# Patient Record
Sex: MALE | URBAN - METROPOLITAN AREA
[De-identification: names, ages, dates, MRNs, and addresses within clinical notes are randomized per-mention and may not be internally consistent; named-entity substitution may affect disease eponyms.]

---

## 2022-11-08 ENCOUNTER — APPOINTMENT (OUTPATIENT)
Dept: URBAN - METROPOLITAN AREA CLINIC 276 | Age: 27
Setting detail: DERMATOLOGY
End: 2022-11-08

## 2022-11-08 DIAGNOSIS — L21.8 OTHER SEBORRHEIC DERMATITIS: ICD-10-CM

## 2022-11-08 PROCEDURE — 99204 OFFICE O/P NEW MOD 45 MIN: CPT

## 2022-11-08 PROCEDURE — OTHER PRESCRIPTION: OTHER

## 2022-11-08 PROCEDURE — OTHER MIPS QUALITY: OTHER

## 2022-11-08 PROCEDURE — OTHER COUNSELING: OTHER

## 2022-11-08 RX ORDER — DESONIDE 0.5 MG/G
CREAM TOPICAL BID
Qty: 60 | Refills: 5 | Status: ERX | COMMUNITY
Start: 2022-11-08

## 2022-11-08 RX ORDER — KETOCONAZOLE 20 MG/ML
SHAMPOO, SUSPENSION TOPICAL
Qty: 120 | Refills: 5 | Status: ERX | COMMUNITY
Start: 2022-11-08

## 2022-11-08 ASSESSMENT — LOCATION DETAILED DESCRIPTION DERM
LOCATION DETAILED: RIGHT SUPERIOR ANTERIOR NECK
LOCATION DETAILED: MID-FRONTAL SCALP
LOCATION DETAILED: INFERIOR MID FOREHEAD

## 2022-11-08 ASSESSMENT — LOCATION SIMPLE DESCRIPTION DERM
LOCATION SIMPLE: ANTERIOR SCALP
LOCATION SIMPLE: INFERIOR FOREHEAD
LOCATION SIMPLE: RIGHT ANTERIOR NECK

## 2022-11-08 ASSESSMENT — LOCATION ZONE DERM
LOCATION ZONE: FACE
LOCATION ZONE: SCALP
LOCATION ZONE: NECK

## 2022-11-08 NOTE — HPI: DRY SKIN
How Severe Is Your Dry Skin?: moderate
Is This A New Presentation Or A Follow-Up?: Dry Skin
Additional History: Patients face used to be oily.  He used to use CeraVe then he started to develop big red circles on his forehead.  He then discontinued using ceraVe.  He has always had dandruff.
How Many Showers Or Baths Do You Take In One Day?: 1x week

## 2023-11-28 ENCOUNTER — RX ONLY (RX ONLY)
Age: 28
End: 2023-11-28

## 2023-11-28 ENCOUNTER — APPOINTMENT (OUTPATIENT)
Dept: URBAN - METROPOLITAN AREA CLINIC 276 | Age: 28
Setting detail: DERMATOLOGY
End: 2023-11-28

## 2023-11-28 DIAGNOSIS — L82.1 OTHER SEBORRHEIC KERATOSIS: ICD-10-CM

## 2023-11-28 DIAGNOSIS — L21.8 OTHER SEBORRHEIC DERMATITIS: ICD-10-CM

## 2023-11-28 DIAGNOSIS — L81.4 OTHER MELANIN HYPERPIGMENTATION: ICD-10-CM

## 2023-11-28 DIAGNOSIS — B07.8 OTHER VIRAL WARTS: ICD-10-CM

## 2023-11-28 DIAGNOSIS — D22 MELANOCYTIC NEVI: ICD-10-CM

## 2023-11-28 PROBLEM — D22.5 MELANOCYTIC NEVI OF TRUNK: Status: ACTIVE | Noted: 2023-11-28

## 2023-11-28 PROCEDURE — OTHER TREATMENT REGIMEN: OTHER

## 2023-11-28 PROCEDURE — 17110 DESTRUCT B9 LESION 1-14: CPT

## 2023-11-28 PROCEDURE — OTHER CANTHARIDIN: OTHER

## 2023-11-28 PROCEDURE — OTHER COUNSELING: OTHER

## 2023-11-28 PROCEDURE — OTHER SUNSCREEN RECOMMENDATIONS: OTHER

## 2023-11-28 PROCEDURE — 99213 OFFICE O/P EST LOW 20 MIN: CPT | Mod: 25

## 2023-11-28 RX ORDER — KETOCONAZOLE 20 MG/ML
SHAMPOO, SUSPENSION TOPICAL
Qty: 120 | Refills: 5 | Status: ACTIVE

## 2023-11-28 RX ORDER — DESONIDE 0.5 MG/G
CREAM TOPICAL BID
Qty: 60 | Refills: 5 | Status: ACTIVE

## 2023-11-28 ASSESSMENT — LOCATION ZONE DERM
LOCATION ZONE: NECK
LOCATION ZONE: SCALP
LOCATION ZONE: TRUNK
LOCATION ZONE: FACE
LOCATION ZONE: FEET

## 2023-11-28 ASSESSMENT — LOCATION SIMPLE DESCRIPTION DERM
LOCATION SIMPLE: LEFT UPPER BACK
LOCATION SIMPLE: POSTERIOR NECK
LOCATION SIMPLE: LEFT CHEEK
LOCATION SIMPLE: POSTERIOR SCALP
LOCATION SIMPLE: UPPER BACK
LOCATION SIMPLE: RIGHT PLANTAR SURFACE

## 2023-11-28 ASSESSMENT — LOCATION DETAILED DESCRIPTION DERM
LOCATION DETAILED: RIGHT LATERAL TRAPEZIAL NECK
LOCATION DETAILED: POSTERIOR MID-PARIETAL SCALP
LOCATION DETAILED: LEFT SUPERIOR UPPER BACK
LOCATION DETAILED: LEFT MEDIAL MALAR CHEEK
LOCATION DETAILED: SUPERIOR THORACIC SPINE
LOCATION DETAILED: RIGHT MEDIAL PLANTAR HEEL

## 2023-11-28 NOTE — PROCEDURE: CANTHARIDIN
Cantharone Plus Duration Text (Please Remove Duration From Postcare): The patient was instructed to leave the Cantharone Plus on for 6-8 hours and then wash the area well with soap and water.
Include Z78.9 (Other Specified Conditions Influencing Health Status) As An Associated Diagnosis?: Yes
Cantharone Duration Text (Please Remove Duration From Postcare): The patient was instructed to leave the Cantharone on for 6-8 hours and then wash the area well with soap and water.
Post-Care Instructions: I reviewed with the patient in detail post-care instructions. The patient understands that the treated areas should be washed off 6 to 8 hours after application.
Add 52 Modifier (Optional): no
Consent: The patient's consent was obtained including but not limited to risks of crusting, scabbing, scarring, blistering, darker or lighter pigmentary change, recurrence, incomplete removal and infection.
Canthacur Duration Text (Please Remove Duration From Postcare): The patient was instructed to leave the Canthacur on for 6-8 hours and then wash the area well with soap and water.
Strength: [facility] plus
Medical Necessity Information: It is in your best interest to select a reason for this procedure from the list below. All of these items fulfill various CMS LCD requirements except the new and changing color options.
Detail Level: Zone
Cantharone Forte Duration Text (Please Remove Duration From Postcare): The patient was instructed to leave the Cantharone Forte on for 6-8 hours and then wash the area well with soap and water.
Curette Text: Prior to application of cantharidin the lesions were lightly pared with a curette.
Medical Necessity Clause: This procedure was medically necessary because the lesions that were treated were:
Canthacur Ps Duration Text (Please Remove Duration From Postcare): The patient was instructed to leave the Canthacur PS on for 6-8 hours and then wash the area well with soap and water.

## 2024-01-08 ENCOUNTER — APPOINTMENT (OUTPATIENT)
Dept: URBAN - METROPOLITAN AREA CLINIC 276 | Age: 29
Setting detail: DERMATOLOGY
End: 2024-01-09

## 2024-01-08 DIAGNOSIS — B07.8 OTHER VIRAL WARTS: ICD-10-CM

## 2024-01-08 PROCEDURE — OTHER CANTHARIDIN: OTHER

## 2024-01-08 PROCEDURE — 17110 DESTRUCT B9 LESION 1-14: CPT

## 2024-01-08 PROCEDURE — OTHER COUNSELING: OTHER

## 2024-01-08 ASSESSMENT — LOCATION DETAILED DESCRIPTION DERM
LOCATION DETAILED: RIGHT MEDIAL PLANTAR HEEL
LOCATION DETAILED: RIGHT HEEL

## 2024-01-08 ASSESSMENT — LOCATION ZONE DERM: LOCATION ZONE: FEET

## 2024-01-08 ASSESSMENT — LOCATION SIMPLE DESCRIPTION DERM
LOCATION SIMPLE: RIGHT PLANTAR SURFACE
LOCATION SIMPLE: RIGHT HEEL

## 2024-01-08 NOTE — PROCEDURE: CANTHARIDIN
Cantharone Plus Duration Text (Please Remove Duration From Postcare): The patient was instructed to leave the Cantharone Plus on for 6-8 hours and then wash the area well with soap and water.
Include Z78.9 (Other Specified Conditions Influencing Health Status) As An Associated Diagnosis?: Yes
Cantharone Duration Text (Please Remove Duration From Postcare): The patient was instructed to leave the Cantharone on for 6-8 hours and then wash the area well with soap and water.
Post-Care Instructions: I reviewed with the patient in detail post-care instructions. The patient understands that the treated areas should be washed off 6 to 8 hours after application.
Add 52 Modifier (Optional): no
Consent: The patient's consent was obtained including but not limited to risks of crusting, scabbing, scarring, blistering, darker or lighter pigmentary change, recurrence, incomplete removal and infection.
Canthacur Duration Text (Please Remove Duration From Postcare): The patient was instructed to leave the Canthacur on for 6-8 hours and then wash the area well with soap and water.
Strength: [facility] plus
Medical Necessity Information: It is in your best interest to select a reason for this procedure from the list below. All of these items fulfill various CMS LCD requirements except the new and changing color options.
Detail Level: Zone
Cantharone Forte Duration Text (Please Remove Duration From Postcare): The patient was instructed to leave the Cantharone Forte on for 6-8 hours and then wash the area well with soap and water.
Curette Text: Prior to application of cantharidin the lesions were lightly pared with a curette.
Medical Necessity Clause: This procedure was medically necessary because the lesions that were treated were:
Canthacur Ps Duration Text (Please Remove Duration From Postcare): The patient was instructed to leave the Canthacur PS on for 6-8 hours and then wash the area well with soap and water.

## 2024-04-02 ENCOUNTER — APPOINTMENT (OUTPATIENT)
Dept: URBAN - METROPOLITAN AREA CLINIC 276 | Age: 29
Setting detail: DERMATOLOGY
End: 2024-04-04

## 2024-04-02 DIAGNOSIS — B07.8 OTHER VIRAL WARTS: ICD-10-CM

## 2024-04-02 DIAGNOSIS — L40.0 PSORIASIS VULGARIS: ICD-10-CM

## 2024-04-02 PROCEDURE — 99214 OFFICE O/P EST MOD 30 MIN: CPT | Mod: 25

## 2024-04-02 PROCEDURE — OTHER COUNSELING: OTHER

## 2024-04-02 PROCEDURE — OTHER BLEOMYCIN: OTHER

## 2024-04-02 PROCEDURE — 11900 INJECT SKIN LESIONS </W 7: CPT

## 2024-04-02 PROCEDURE — OTHER PRESCRIPTION: OTHER

## 2024-04-02 RX ORDER — CLOBETASOL PROPIONATE 0.5 MG/G
CREAM TOPICAL BID
Qty: 60 | Refills: 3 | Status: ERX | COMMUNITY
Start: 2024-04-02

## 2024-04-02 ASSESSMENT — LOCATION ZONE DERM
LOCATION ZONE: FEET
LOCATION ZONE: HAND
LOCATION ZONE: ARM

## 2024-04-02 ASSESSMENT — LOCATION DETAILED DESCRIPTION DERM
LOCATION DETAILED: RIGHT PROXIMAL DORSAL FOREARM
LOCATION DETAILED: RIGHT MEDIAL PLANTAR HEEL
LOCATION DETAILED: LEFT RADIAL DORSAL HAND

## 2024-04-02 ASSESSMENT — LOCATION SIMPLE DESCRIPTION DERM
LOCATION SIMPLE: LEFT HAND
LOCATION SIMPLE: RIGHT FOREARM
LOCATION SIMPLE: RIGHT PLANTAR SURFACE

## 2024-04-02 NOTE — PROCEDURE: BLEOMYCIN
Ndc# (Optional): 35154-405-28
Post-Care Instructions: I reviewed with the patient in detail post-care instructions. The patient understands that the treated areas should be washed off 6 to 8 hours after application.
Bill For Wasted Drug?: no
Anesthesia Type: 1% lidocaine with epinephrine
Total Volume (Ccs): 0.3
Bill J-Code: yes
Detail Level: Simple
Consent: The patient's consent was obtained including but not limited to risks of crusting, scabbing, scarring, blistering, flagellate hyperpigmentation, local vasospasm, darker or lighter pigmentary change, recurrence, incomplete removal and infection.
Concentration (Units/Cc): 1
Method Of Treatment: injection

## 2024-04-23 ENCOUNTER — APPOINTMENT (OUTPATIENT)
Dept: URBAN - METROPOLITAN AREA CLINIC 276 | Age: 29
Setting detail: DERMATOLOGY
End: 2024-04-29

## 2024-04-23 DIAGNOSIS — B07.8 OTHER VIRAL WARTS: ICD-10-CM

## 2024-04-23 PROCEDURE — OTHER BLEOMYCIN: OTHER

## 2024-04-23 PROCEDURE — OTHER MIPS QUALITY: OTHER

## 2024-04-23 PROCEDURE — OTHER COUNSELING: OTHER

## 2024-04-23 PROCEDURE — 11900 INJECT SKIN LESIONS </W 7: CPT

## 2024-04-23 ASSESSMENT — LOCATION ZONE DERM: LOCATION ZONE: FEET

## 2024-04-23 ASSESSMENT — LOCATION SIMPLE DESCRIPTION DERM: LOCATION SIMPLE: RIGHT PLANTAR SURFACE

## 2024-04-23 ASSESSMENT — LOCATION DETAILED DESCRIPTION DERM: LOCATION DETAILED: RIGHT MEDIAL PLANTAR HEEL

## 2024-04-23 NOTE — PROCEDURE: MIPS QUALITY
Quality 486: Dermatitis - Improvement In Patient-Reported Itch Severity: Itch severity assessment score is reduced by 2 or more points from the initial (index) assessment score to the follow-up visit score
Quality 47: Advance Care Plan: Advance Care Planning discussed and documented in the medical record; patient did not wish or was not able to name a surrogate decision maker or provide an advance care plan.
Quality 137: Melanoma: Continuity Of Care - Recall System: Patient information entered into a recall system that includes: target date for the next exam specified AND a process to follow up with patients regarding missed or unscheduled appointments
Quality 410: Psoriasis Clinical Response To Oral Systemic Or Biologic Medications: Psoriasis Assessment Tool Documented, Met Specified Benchmark
Quality 176: Tuberculosis Screening Prior To First Course Of Biologic And/Or Immune Response Modifier Therapy: Patient receiving first-time biologic and/or immune response modifier therapy, TB Screening Performed and Results Interpreted within 12 months
Quality 358: Patient-Centered Surgical Risk Assessment And Communication: Documentation of patient-specific risk assessment with a risk calculator based on multi-institutional clinical data, the specific risk calculator used, and communication of risk assessment from risk calculator with the patient or family.
Detail Level: Detailed
Quality 226: Preventive Care And Screening: Tobacco Use: Screening And Cessation Intervention: Patient screened for tobacco use and is an ex/non-smoker

## 2024-04-23 NOTE — PROCEDURE: BLEOMYCIN
Consent: The patient's consent was obtained including but not limited to risks of crusting, scabbing, scarring, blistering, flagellate hyperpigmentation, local vasospasm, darker or lighter pigmentary change, recurrence, incomplete removal and infection.
Add 52 Modifier (Optional): no
Detail Level: Detailed
Ndc# (Optional): [id number]
Post-Care Instructions: I reviewed with the patient in detail post-care instructions. The patient understands that the treated areas should be washed off 6 to 8 hours after application.
Concentration (Units/Cc): 1
Bill J-Code: yes
Total Volume (Ccs): 0.4
Anesthesia Type: 1% lidocaine with epinephrine
Method Of Treatment: injection

## 2025-01-13 ENCOUNTER — RX ONLY (RX ONLY)
Age: 30
End: 2025-01-13

## 2025-01-13 RX ORDER — DESONIDE 0.5 MG/G
CREAM TOPICAL BID
Qty: 60 | Refills: 3 | Status: ERX

## 2025-06-17 ENCOUNTER — APPOINTMENT (OUTPATIENT)
Dept: URBAN - METROPOLITAN AREA CLINIC 276 | Age: 30
Setting detail: DERMATOLOGY
End: 2025-06-26

## 2025-06-17 ENCOUNTER — RX ONLY (RX ONLY)
Age: 30
End: 2025-06-17

## 2025-06-17 DIAGNOSIS — L21.8 OTHER SEBORRHEIC DERMATITIS: ICD-10-CM

## 2025-06-17 PROCEDURE — 99213 OFFICE O/P EST LOW 20 MIN: CPT

## 2025-06-17 PROCEDURE — OTHER MIPS QUALITY: OTHER

## 2025-06-17 PROCEDURE — OTHER COUNSELING: OTHER

## 2025-06-17 PROCEDURE — OTHER TREATMENT REGIMEN: OTHER

## 2025-06-17 RX ORDER — KETOCONAZOLE 20 MG/ML
SHAMPOO, SUSPENSION TOPICAL
Qty: 120 | Refills: 12 | Status: ERX

## 2025-06-17 RX ORDER — DESONIDE 0.5 MG/G
CREAM TOPICAL BID
Qty: 60 | Refills: 12 | Status: ERX

## 2025-06-17 ASSESSMENT — LOCATION ZONE DERM
LOCATION ZONE: SCALP
LOCATION ZONE: FACE

## 2025-06-17 ASSESSMENT — LOCATION SIMPLE DESCRIPTION DERM
LOCATION SIMPLE: POSTERIOR SCALP
LOCATION SIMPLE: LEFT CHEEK

## 2025-06-17 ASSESSMENT — LOCATION DETAILED DESCRIPTION DERM
LOCATION DETAILED: POSTERIOR MID-PARIETAL SCALP
LOCATION DETAILED: LEFT MEDIAL MALAR CHEEK